# Patient Record
Sex: FEMALE | Race: BLACK OR AFRICAN AMERICAN | NOT HISPANIC OR LATINO | Employment: FULL TIME | URBAN - METROPOLITAN AREA
[De-identification: names, ages, dates, MRNs, and addresses within clinical notes are randomized per-mention and may not be internally consistent; named-entity substitution may affect disease eponyms.]

---

## 2019-03-27 ENCOUNTER — INITIAL CONSULT (OUTPATIENT)
Dept: NEUROSURGERY | Facility: CLINIC | Age: 49
End: 2019-03-27
Payer: COMMERCIAL

## 2019-03-27 VITALS
WEIGHT: 170.19 LBS | TEMPERATURE: 98 F | DIASTOLIC BLOOD PRESSURE: 80 MMHG | HEART RATE: 96 BPM | HEIGHT: 64 IN | BODY MASS INDEX: 29.06 KG/M2 | SYSTOLIC BLOOD PRESSURE: 116 MMHG

## 2019-03-27 DIAGNOSIS — H47.10 PAPILLEDEMA OF BOTH EYES: ICD-10-CM

## 2019-03-27 DIAGNOSIS — E22.1 HYPERPROLACTINEMIA: ICD-10-CM

## 2019-03-27 DIAGNOSIS — H53.469 HOMONYMOUS HEMIANOPIA, UNSPECIFIED LATERALITY: ICD-10-CM

## 2019-03-27 DIAGNOSIS — D49.7 NEOPLASM OF CENTRAL NERVOUS SYSTEM: ICD-10-CM

## 2019-03-27 DIAGNOSIS — D49.7 PITUITARY TUMOR: ICD-10-CM

## 2019-03-27 PROCEDURE — 99999 PR PBB SHADOW E&M-NEW PATIENT-LVL III: ICD-10-PCS | Mod: PBBFAC,,, | Performed by: NEUROLOGICAL SURGERY

## 2019-03-27 PROCEDURE — 99999 PR PBB SHADOW E&M-NEW PATIENT-LVL III: CPT | Mod: PBBFAC,,, | Performed by: NEUROLOGICAL SURGERY

## 2019-03-27 RX ORDER — OLMESARTAN MEDOXOMIL 5 MG/1
5 TABLET ORAL DAILY
COMMUNITY
End: 2019-04-12

## 2019-03-27 RX ORDER — CYCLOBENZAPRINE HCL 10 MG
10 TABLET ORAL 3 TIMES DAILY PRN
COMMUNITY
End: 2019-04-12

## 2019-03-27 NOTE — PROGRESS NOTES
Neurosurgery Outpatient Follow Up    Patient ID: Gianna Menendez is a 48 y.o. female.    Chief Complaint   Patient presents with    Brain Tumor     Patient referred for a pituitary lesion. She has vision loss in both eyes that started about a month ago. Right > left           Review of Systems   Constitutional: Negative for activity change, appetite change, chills, fever and unexpected weight change.   HENT: Positive for sinus pressure. Negative for tinnitus, trouble swallowing and voice change.    Eyes: Positive for visual disturbance.   Respiratory: Negative for apnea, cough, chest tightness and shortness of breath.    Cardiovascular: Negative for chest pain and palpitations.   Gastrointestinal: Negative for constipation, diarrhea, nausea and vomiting.   Genitourinary: Negative for difficulty urinating, dysuria, frequency and urgency.   Musculoskeletal: Negative for back pain, gait problem, neck pain and neck stiffness.   Skin: Negative for wound.   Neurological: Positive for headaches. Negative for dizziness, tremors, seizures, facial asymmetry, speech difficulty, weakness, light-headedness and numbness.        Visual loss, bilateral  Decreased peripheral vision   Psychiatric/Behavioral: Positive for dysphoric mood. Negative for confusion and decreased concentration.       History reviewed. No pertinent past medical history.  Social History     Socioeconomic History    Marital status:      Spouse name: Not on file    Number of children: Not on file    Years of education: Not on file    Highest education level: Not on file   Occupational History    Not on file   Social Needs    Financial resource strain: Not on file    Food insecurity:     Worry: Not on file     Inability: Not on file    Transportation needs:     Medical: Not on file     Non-medical: Not on file   Tobacco Use    Smoking status: Never Smoker   Substance and Sexual Activity    Alcohol use: Not on file    Drug use: Not on  "file    Sexual activity: Not on file   Lifestyle    Physical activity:     Days per week: Not on file     Minutes per session: Not on file    Stress: Not on file   Relationships    Social connections:     Talks on phone: Not on file     Gets together: Not on file     Attends Voodoo service: Not on file     Active member of club or organization: Not on file     Attends meetings of clubs or organizations: Not on file     Relationship status: Not on file    Intimate partner violence:     Fear of current or ex partner: Not on file     Emotionally abused: Not on file     Physically abused: Not on file     Forced sexual activity: Not on file   Other Topics Concern    Not on file   Social History Narrative    Not on file     Family History   Problem Relation Age of Onset    Rheum arthritis Maternal Grandmother      Review of patient's allergies indicates:   Allergen Reactions    Codeine     Latex, natural rubber Itching     Pt refused to go to latex free room       Current Outpatient Medications:     cyclobenzaprine (FLEXERIL) 10 MG tablet, Take 10 mg by mouth 3 (three) times daily as needed for Muscle spasms., Disp: , Rfl:     DIAZEPAM (VALIUM ORAL), Take by mouth continuous prn., Disp: , Rfl:     fluoxetine (PROZAC) 20 MG capsule, Take 20 mg by mouth once daily., Disp: , Rfl:     olmesartan (BENICAR) 5 MG Tab, Take 5 mg by mouth once daily., Disp: , Rfl:   Blood pressure 116/80, pulse 96, temperature 98.3 °F (36.8 °C), temperature source Oral, height 5' 4" (1.626 m), weight 77.2 kg (170 lb 3.1 oz).      Neurologic Exam     Mental Status   Oriented to person, place, and time.   Speech: speech is normal     Cranial Nerves     CN III, IV, VI   Pupils are equal, round, and reactive to light.  Extraocular motions are normal.     Motor Exam     Strength   Strength 5/5 throughout.     Gait, Coordination, and Reflexes     Reflexes   Right brachioradialis: 2+  Left brachioradialis: 2+  Right biceps: 2+  Left " biceps: 2+  Right triceps: 2+  Left triceps: 2+  Right patellar: 2+  Left patellar: 2+  Right achilles: 2+  Left achilles: 2+      Physical Exam   Constitutional: She is oriented to person, place, and time. She appears well-developed and well-nourished.   HENT:   Head: Normocephalic and atraumatic.   Eyes: Pupils are equal, round, and reactive to light. EOM are normal.   Neck: Normal range of motion. Neck supple.   Cardiovascular: Normal rate and intact distal pulses.   Pulmonary/Chest: Effort normal. No respiratory distress.   Abdominal: Soft. She exhibits no distension.   Musculoskeletal: Normal range of motion. She exhibits no edema or deformity.   Neurological: She is oriented to person, place, and time. She has normal strength and normal reflexes. She displays no tremor. A cranial nerve deficit is present. No sensory deficit. She exhibits normal muscle tone. She displays no seizure activity. Coordination and gait normal. GCS eye subscore is 4. GCS verbal subscore is 5. GCS motor subscore is 6. She displays no Babinski's sign on the right side. She displays no Babinski's sign on the left side.   Reflex Scores:       Tricep reflexes are 2+ on the right side and 2+ on the left side.       Bicep reflexes are 2+ on the right side and 2+ on the left side.       Brachioradialis reflexes are 2+ on the right side and 2+ on the left side.       Patellar reflexes are 2+ on the right side and 2+ on the left side.       Achilles reflexes are 2+ on the right side and 2+ on the left side.  Bilateral homonymous hemianopsia  Papilloedema on fundoscopy bilaterally    Enlarged breast/areolar tissue  No galactorrhea   Skin: Skin is warm and dry.   Psychiatric: She has a normal mood and affect. Her speech is normal and behavior is normal. Judgment and thought content normal.   Nursing note and vitals reviewed.      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 Effie Light for evaluation of a newly discovered pituitary lesion. She was seen by ophthalmology for visual loss and a brain MRI demonstrated a large sellar lesion for which she was referred to Dr Modi. This is associated decreased FSH and free T4 and severe elevation of Prolactin with >6,0000.     She is  and has no clear history of neuroendocrinological disturbance in the past, and has been on a depot anticontraceptive injection for 15 years. She now feels fairly free of symptoms aside from the visual loss. She does not have signifcant headaches, nausea, vomiting, and no new weight gain.     I have reviewed a the recent MRI scans showing a giant hypodense lesion in the sella invading the sphenoid sinus and the bilateral cavernous sinus walls, eroding the clivus and displacing the pituitary gland and the optic apparatus cephalad. It is measuring over 3.3 x 4.5 cm and has areas of mixed density but avid contrast reuptake.    This lady has severe visual loss and neuroendocrine dysfunction and hyperprolactinemia associated with a giant pituitary macroadenoma, and she would benefit from urgent surgical treatment. Although medical treatment is the mainstay of prolactinoma treatment, this giant lesion with erosion of the clivus and displacement of the optic chiasm require urgent surgical removal to recover vision. Given its atypical appearance histopathology is also required for definitive diagnosis.     We will plan an urgent endoscopic transnasal transsphenoidal approach in collaboration with Dr. Guillaume (ENT). We will schedule this as soon as possible.    Visit Diagnosis:  Pituitary tumor    Papilledema of both eyes    Homonymous hemianopia, unspecified laterality    Hyperprolactinemia

## 2019-03-28 DIAGNOSIS — D49.7 NEOPLASM OF CENTRAL NERVOUS SYSTEM: ICD-10-CM

## 2019-03-28 DIAGNOSIS — D49.7 PITUITARY TUMOR: Primary | ICD-10-CM

## 2019-03-28 RX ORDER — LIDOCAINE HYDROCHLORIDE 10 MG/ML
1 INJECTION, SOLUTION EPIDURAL; INFILTRATION; INTRACAUDAL; PERINEURAL ONCE
Status: CANCELLED | OUTPATIENT
Start: 2019-03-28 | End: 2019-03-28

## 2019-03-28 RX ORDER — SODIUM CHLORIDE, SODIUM LACTATE, POTASSIUM CHLORIDE, CALCIUM CHLORIDE 600; 310; 30; 20 MG/100ML; MG/100ML; MG/100ML; MG/100ML
INJECTION, SOLUTION INTRAVENOUS CONTINUOUS
Status: CANCELLED | OUTPATIENT
Start: 2019-03-28

## 2019-04-03 ENCOUNTER — TELEPHONE (OUTPATIENT)
Dept: NEUROSURGERY | Facility: CLINIC | Age: 49
End: 2019-04-03

## 2019-04-03 DIAGNOSIS — D49.7 NEOPLASM OF CENTRAL NERVOUS SYSTEM: Primary | ICD-10-CM

## 2019-04-03 NOTE — TELEPHONE ENCOUNTER
----- Message from Lou Harman sent at 4/3/2019 10:11 AM CDT -----  Contact: Patient  Type:  Patient Returning Call    Who Called:  Patient  Who Left Message for Patient:  Yadira  Does the patient know what this is regarding?:  procedure  Best Call Back Number:   Additional Information:  Call to pod. Call connected to pod. Yadira is on the phone, will call patient back.

## 2019-04-04 ENCOUNTER — HOSPITAL ENCOUNTER (OUTPATIENT)
Dept: RADIOLOGY | Facility: HOSPITAL | Age: 49
Discharge: HOME | End: 2019-04-04
Attending: NEUROLOGICAL SURGERY
Payer: COMMERCIAL

## 2019-04-04 DIAGNOSIS — D49.7 PITUITARY TUMOR: Primary | ICD-10-CM

## 2019-04-04 DIAGNOSIS — D49.7 NEOPLASM OF CENTRAL NERVOUS SYSTEM: ICD-10-CM

## 2019-04-04 PROCEDURE — 70553 MRI BRAIN STEM W/O & W/DYE: CPT | Mod: TC,PO

## 2019-04-04 PROCEDURE — A9585 GADOBUTROL INJECTION: HCPCS | Mod: PO | Performed by: NEUROLOGICAL SURGERY

## 2019-04-04 PROCEDURE — 70553 MRI BRAIN STEM W/O & W/DYE: CPT | Mod: 26,,, | Performed by: RADIOLOGY

## 2019-04-04 PROCEDURE — 70486 CT MEDTRONIC SINUSES WITHOUT: ICD-10-PCS | Mod: 26,,, | Performed by: RADIOLOGY

## 2019-04-04 PROCEDURE — 70486 CT MAXILLOFACIAL W/O DYE: CPT | Mod: TC,PO

## 2019-04-04 PROCEDURE — 25500020 PHARM REV CODE 255: Mod: PO | Performed by: NEUROLOGICAL SURGERY

## 2019-04-04 PROCEDURE — 70553 MRI BRAIN SYNAPTIVE STEALTH W/WO CONTRAST: ICD-10-PCS | Mod: 26,,, | Performed by: RADIOLOGY

## 2019-04-04 PROCEDURE — 70486 CT MAXILLOFACIAL W/O DYE: CPT | Mod: 26,,, | Performed by: RADIOLOGY

## 2019-04-04 RX ORDER — GADOBUTROL 604.72 MG/ML
7 INJECTION INTRAVENOUS
Status: COMPLETED | OUTPATIENT
Start: 2019-04-04 | End: 2019-04-04

## 2019-04-04 RX ORDER — SODIUM CHLORIDE, SODIUM LACTATE, POTASSIUM CHLORIDE, CALCIUM CHLORIDE 600; 310; 30; 20 MG/100ML; MG/100ML; MG/100ML; MG/100ML
INJECTION, SOLUTION INTRAVENOUS CONTINUOUS
Status: CANCELLED | OUTPATIENT
Start: 2019-04-04

## 2019-04-04 RX ADMIN — GADOBUTROL 7 ML: 604.72 INJECTION INTRAVENOUS at 12:04

## 2019-04-08 PROBLEM — I10 ESSENTIAL (PRIMARY) HYPERTENSION: Status: ACTIVE | Noted: 2019-04-08

## 2019-04-08 PROBLEM — F17.200 TOBACCO USE DISORDER: Status: ACTIVE | Noted: 2019-04-08

## 2019-04-08 PROBLEM — D49.7 PITUITARY TUMOR: Status: ACTIVE | Noted: 2019-04-08

## 2019-04-09 PROBLEM — E83.42 HYPOMAGNESEMIA: Status: ACTIVE | Noted: 2019-04-09

## 2019-04-10 PROBLEM — E03.9 ACQUIRED HYPOTHYROIDISM: Status: ACTIVE | Noted: 2019-04-10

## 2019-04-16 ENCOUNTER — TELEPHONE (OUTPATIENT)
Dept: NEUROSURGERY | Facility: CLINIC | Age: 49
End: 2019-04-16

## 2019-04-16 NOTE — TELEPHONE ENCOUNTER
KATHY. Patient had her one week post op BMP done today in [place]. Results have been scanned into media.

## 2019-05-30 DIAGNOSIS — D49.7 NEOPLASM OF CENTRAL NERVOUS SYSTEM: ICD-10-CM

## 2019-06-04 DIAGNOSIS — D49.7 NEOPLASM OF CENTRAL NERVOUS SYSTEM: Primary | ICD-10-CM

## 2019-06-05 ENCOUNTER — OFFICE VISIT (OUTPATIENT)
Dept: NEUROSURGERY | Facility: CLINIC | Age: 49
End: 2019-06-05
Payer: COMMERCIAL

## 2019-06-05 ENCOUNTER — HOSPITAL ENCOUNTER (OUTPATIENT)
Dept: RADIOLOGY | Facility: HOSPITAL | Age: 49
Discharge: HOME | End: 2019-06-05
Attending: NEUROLOGICAL SURGERY
Payer: COMMERCIAL

## 2019-06-05 VITALS
DIASTOLIC BLOOD PRESSURE: 80 MMHG | TEMPERATURE: 98 F | SYSTOLIC BLOOD PRESSURE: 130 MMHG | WEIGHT: 172.63 LBS | HEART RATE: 86 BPM | BODY MASS INDEX: 29.47 KG/M2 | HEIGHT: 64 IN

## 2019-06-05 DIAGNOSIS — D49.7 NEOPLASM OF CENTRAL NERVOUS SYSTEM: ICD-10-CM

## 2019-06-05 DIAGNOSIS — D44.3 PROLACTINOMA OF UNCERTAIN BEHAVIOR: Primary | ICD-10-CM

## 2019-06-05 PROCEDURE — 99024 PR POST-OP FOLLOW-UP VISIT: ICD-10-PCS | Mod: S$GLB,,, | Performed by: NEUROLOGICAL SURGERY

## 2019-06-05 PROCEDURE — 70553 MRI BRAIN STEM W/O & W/DYE: CPT | Mod: 26,,, | Performed by: RADIOLOGY

## 2019-06-05 PROCEDURE — 70553 MRI PITUITARY W W/O CONTRAST: ICD-10-PCS | Mod: 26,,, | Performed by: RADIOLOGY

## 2019-06-05 PROCEDURE — 70553 MRI BRAIN STEM W/O & W/DYE: CPT | Mod: TC,PO

## 2019-06-05 PROCEDURE — 25500020 PHARM REV CODE 255: Mod: PO | Performed by: NEUROLOGICAL SURGERY

## 2019-06-05 PROCEDURE — 99999 PR PBB SHADOW E&M-EST. PATIENT-LVL III: CPT | Mod: PBBFAC,,, | Performed by: NEUROLOGICAL SURGERY

## 2019-06-05 PROCEDURE — 99024 POSTOP FOLLOW-UP VISIT: CPT | Mod: S$GLB,,, | Performed by: NEUROLOGICAL SURGERY

## 2019-06-05 PROCEDURE — 99999 PR PBB SHADOW E&M-EST. PATIENT-LVL III: ICD-10-PCS | Mod: PBBFAC,,, | Performed by: NEUROLOGICAL SURGERY

## 2019-06-05 PROCEDURE — A9585 GADOBUTROL INJECTION: HCPCS | Mod: PO | Performed by: NEUROLOGICAL SURGERY

## 2019-06-05 RX ORDER — GADOBUTROL 604.72 MG/ML
7 INJECTION INTRAVENOUS
Status: COMPLETED | OUTPATIENT
Start: 2019-06-05 | End: 2019-06-05

## 2019-06-05 RX ADMIN — GADOBUTROL 7 ML: 604.72 INJECTION INTRAVENOUS at 01:06

## 2019-06-20 ENCOUNTER — TELEPHONE (OUTPATIENT)
Dept: OPHTHALMOLOGY | Facility: CLINIC | Age: 49
End: 2019-06-20

## 2019-06-20 NOTE — TELEPHONE ENCOUNTER
----- Message from Omaira Huerta sent at 6/20/2019  9:26 AM CDT -----  Cheryle, please call this pt.  Pt is on for tomorrow to see Manish but in the notes it states she is to see Neuro-Ophthalmology.  I not sure if Wei can do anything for pt.                                                                                   Thanks

## 2019-06-20 NOTE — TELEPHONE ENCOUNTER
Called pt concerning appt with Dr Hammond for 6./21. Let pt know that she needed to see a neuroophthalmologic instead of general per Dr Gar. Pt said the nurse at Dr Gar's office said she could see someone close to home and she did see a doctor in [place], [place] last week. She also had a visual field test at that time. I told her see needs to have the records sent form that ophthalmologist to Dr Gar's office. Pt will do that.

## 2019-08-22 ENCOUNTER — OUTSIDE PLACE OF SERVICE (OUTPATIENT)
Dept: NEUROSURGERY | Facility: CLINIC | Age: 49
End: 2019-08-22
Payer: COMMERCIAL

## 2019-08-22 ENCOUNTER — TELEPHONE (OUTPATIENT)
Dept: NEUROSURGERY | Facility: CLINIC | Age: 49
End: 2019-08-22

## 2019-08-22 DIAGNOSIS — D49.7 PITUITARY TUMOR: ICD-10-CM

## 2019-08-22 DIAGNOSIS — H47.10 PAPILLEDEMA OF BOTH EYES: ICD-10-CM

## 2019-08-22 DIAGNOSIS — D44.3 PROLACTINOMA OF UNCERTAIN BEHAVIOR: Primary | ICD-10-CM

## 2019-08-22 DIAGNOSIS — E22.1 HYPERPROLACTINEMIA: ICD-10-CM

## 2019-08-22 DIAGNOSIS — H53.469 HOMONYMOUS HEMIANOPIA, UNSPECIFIED LATERALITY: ICD-10-CM

## 2019-08-22 NOTE — TELEPHONE ENCOUNTER
----- Message from Penelope Amado sent at 8/22/2019  9:45 AM CDT -----  Contact: pt @ [phone]  Caller is asking to have the work excuse note faxed to her employer @ [phone]

## 2019-09-24 ENCOUNTER — LAB VISIT (OUTPATIENT)
Dept: LAB | Facility: HOSPITAL | Age: 49
End: 2019-09-24
Attending: NEUROLOGICAL SURGERY
Payer: COMMERCIAL

## 2019-09-24 DIAGNOSIS — D49.7 PITUITARY TUMOR: ICD-10-CM

## 2019-09-24 DIAGNOSIS — D44.3 PROLACTINOMA OF UNCERTAIN BEHAVIOR: ICD-10-CM

## 2019-09-24 DIAGNOSIS — H53.469 HOMONYMOUS HEMIANOPIA, UNSPECIFIED LATERALITY: ICD-10-CM

## 2019-09-24 DIAGNOSIS — E22.1 HYPERPROLACTINEMIA: ICD-10-CM

## 2019-09-24 DIAGNOSIS — H47.10 PAPILLEDEMA OF BOTH EYES: ICD-10-CM

## 2019-09-24 LAB
ALBUMIN SERPL BCP-MCNC: 4.1 G/DL (ref 3.5–5.2)
ALP SERPL-CCNC: 144 U/L (ref 55–135)
ALT SERPL W/O P-5'-P-CCNC: 27 U/L (ref 10–44)
ANION GAP SERPL CALC-SCNC: 10 MMOL/L (ref 8–16)
AST SERPL-CCNC: 29 U/L (ref 10–40)
BILIRUB SERPL-MCNC: 1 MG/DL (ref 0.1–1)
BUN SERPL-MCNC: 9 MG/DL (ref 6–20)
CALCIUM SERPL-MCNC: 10 MG/DL (ref 8.7–10.5)
CHLORIDE SERPL-SCNC: 106 MMOL/L (ref 95–110)
CO2 SERPL-SCNC: 23 MMOL/L (ref 23–29)
CORTIS SERPL-MCNC: 10.4 UG/DL (ref 4.3–22.4)
CREAT SERPL-MCNC: 0.7 MG/DL (ref 0.5–1.4)
EST. GFR  (AFRICAN AMERICAN): >60 ML/MIN/1.73 M^2
EST. GFR  (NON AFRICAN AMERICAN): >60 ML/MIN/1.73 M^2
FSH SERPL-ACNC: 4.7 MIU/ML
GLUCOSE SERPL-MCNC: 90 MG/DL (ref 70–110)
LH SERPL-ACNC: 0.3 MIU/ML
POTASSIUM SERPL-SCNC: 3.6 MMOL/L (ref 3.5–5.1)
PROLACTIN SERPL IA-MCNC: 61.6 NG/ML (ref 5.2–26.5)
PROT SERPL-MCNC: 8.1 G/DL (ref 6–8.4)
SODIUM SERPL-SCNC: 139 MMOL/L (ref 136–145)
T4 FREE SERPL-MCNC: 1 NG/DL (ref 0.71–1.51)

## 2019-09-24 PROCEDURE — 82533 TOTAL CORTISOL: CPT

## 2019-09-24 PROCEDURE — 83001 ASSAY OF GONADOTROPIN (FSH): CPT

## 2019-09-24 PROCEDURE — 82024 ASSAY OF ACTH: CPT

## 2019-09-24 PROCEDURE — 84146 ASSAY OF PROLACTIN: CPT

## 2019-09-24 PROCEDURE — 84305 ASSAY OF SOMATOMEDIN: CPT

## 2019-09-24 PROCEDURE — 84439 ASSAY OF FREE THYROXINE: CPT

## 2019-09-24 PROCEDURE — 83002 ASSAY OF GONADOTROPIN (LH): CPT

## 2019-09-24 PROCEDURE — 80053 COMPREHEN METABOLIC PANEL: CPT

## 2019-09-24 PROCEDURE — 36415 COLL VENOUS BLD VENIPUNCTURE: CPT

## 2019-09-27 LAB — ACTH PLAS-MCNC: 19 PG/ML (ref 0–46)

## 2019-10-01 LAB
IGF-I SERPL-MCNC: 58 NG/ML (ref 44–227)
IGF-I Z-SCORE SERPL: -1.65 SD

## 2019-10-08 ENCOUNTER — OFFICE VISIT (OUTPATIENT)
Dept: OPHTHALMOLOGY | Facility: CLINIC | Age: 49
End: 2019-10-08
Payer: COMMERCIAL

## 2019-10-08 ENCOUNTER — OFFICE VISIT (OUTPATIENT)
Dept: NEUROSURGERY | Facility: CLINIC | Age: 49
End: 2019-10-08
Payer: COMMERCIAL

## 2019-10-08 ENCOUNTER — OFFICE VISIT (OUTPATIENT)
Dept: ENDOCRINOLOGY | Facility: CLINIC | Age: 49
End: 2019-10-08
Payer: COMMERCIAL

## 2019-10-08 ENCOUNTER — CLINICAL SUPPORT (OUTPATIENT)
Dept: OPHTHALMOLOGY | Facility: CLINIC | Age: 49
End: 2019-10-08
Payer: COMMERCIAL

## 2019-10-08 ENCOUNTER — TELEPHONE (OUTPATIENT)
Dept: NEUROSURGERY | Facility: CLINIC | Age: 49
End: 2019-10-08

## 2019-10-08 VITALS
SYSTOLIC BLOOD PRESSURE: 152 MMHG | DIASTOLIC BLOOD PRESSURE: 94 MMHG | WEIGHT: 174.63 LBS | WEIGHT: 174.63 LBS | BODY MASS INDEX: 29.97 KG/M2 | SYSTOLIC BLOOD PRESSURE: 154 MMHG | TEMPERATURE: 98 F | TEMPERATURE: 98 F | HEART RATE: 88 BPM | BODY MASS INDEX: 29.97 KG/M2 | DIASTOLIC BLOOD PRESSURE: 94 MMHG | HEART RATE: 88 BPM

## 2019-10-08 DIAGNOSIS — D35.2 BENIGN PROLACTINOMA: Primary | ICD-10-CM

## 2019-10-08 DIAGNOSIS — E22.1 HYPERPROLACTINEMIA: Primary | ICD-10-CM

## 2019-10-08 DIAGNOSIS — H53.461: ICD-10-CM

## 2019-10-08 DIAGNOSIS — D44.3 PROLACTINOMA OF UNCERTAIN BEHAVIOR: Primary | ICD-10-CM

## 2019-10-08 DIAGNOSIS — H47.20 COMPRESSIVE OPTIC ATROPHY: ICD-10-CM

## 2019-10-08 DIAGNOSIS — D49.7 PITUITARY TUMOR: ICD-10-CM

## 2019-10-08 DIAGNOSIS — E22.1 HYPERPROLACTINEMIA: ICD-10-CM

## 2019-10-08 DIAGNOSIS — D44.3 PROLACTINOMA OF UNCERTAIN BEHAVIOR: ICD-10-CM

## 2019-10-08 DIAGNOSIS — E03.9 ACQUIRED HYPOTHYROIDISM: ICD-10-CM

## 2019-10-08 DIAGNOSIS — D35.2 PITUITARY ADENOMA: ICD-10-CM

## 2019-10-08 DIAGNOSIS — D49.7 PITUITARY TUMOR: Primary | ICD-10-CM

## 2019-10-08 PROBLEM — E23.2: Status: ACTIVE | Noted: 2019-10-08

## 2019-10-08 PROCEDURE — 3008F PR BODY MASS INDEX (BMI) DOCUMENTED: ICD-10-PCS | Mod: CPTII,S$GLB,, | Performed by: NEUROLOGICAL SURGERY

## 2019-10-08 PROCEDURE — 92083 EXTENDED VISUAL FIELD XM: CPT | Mod: S$GLB,,, | Performed by: OPHTHALMOLOGY

## 2019-10-08 PROCEDURE — 92004 PR EYE EXAM, NEW PATIENT,COMPREHESV: ICD-10-PCS | Mod: S$GLB,,, | Performed by: OPHTHALMOLOGY

## 2019-10-08 PROCEDURE — 99999 PR PBB SHADOW E&M-EST. PATIENT-LVL III: CPT | Mod: PBBFAC,,, | Performed by: INTERNAL MEDICINE

## 2019-10-08 PROCEDURE — 3008F PR BODY MASS INDEX (BMI) DOCUMENTED: ICD-10-PCS | Mod: CPTII,S$GLB,, | Performed by: INTERNAL MEDICINE

## 2019-10-08 PROCEDURE — 92133 CPTRZD OPH DX IMG PST SGM ON: CPT | Mod: S$GLB,,, | Performed by: OPHTHALMOLOGY

## 2019-10-08 PROCEDURE — 92083 HUMPHREY VISUAL FIELD - OU - BOTH EYES: ICD-10-PCS | Mod: S$GLB,,, | Performed by: OPHTHALMOLOGY

## 2019-10-08 PROCEDURE — 99213 OFFICE O/P EST LOW 20 MIN: CPT | Mod: S$GLB,,, | Performed by: NEUROLOGICAL SURGERY

## 2019-10-08 PROCEDURE — 3008F BODY MASS INDEX DOCD: CPT | Mod: CPTII,S$GLB,, | Performed by: NEUROLOGICAL SURGERY

## 2019-10-08 PROCEDURE — 99999 PR PBB SHADOW E&M-EST. PATIENT-LVL III: ICD-10-PCS | Mod: PBBFAC,,, | Performed by: NEUROLOGICAL SURGERY

## 2019-10-08 PROCEDURE — 99204 PR OFFICE/OUTPT VISIT, NEW, LEVL IV, 45-59 MIN: ICD-10-PCS | Mod: S$GLB,,, | Performed by: INTERNAL MEDICINE

## 2019-10-08 PROCEDURE — 99999 PR PBB SHADOW E&M-EST. PATIENT-LVL III: CPT | Mod: PBBFAC,,, | Performed by: NEUROLOGICAL SURGERY

## 2019-10-08 PROCEDURE — 3008F BODY MASS INDEX DOCD: CPT | Mod: CPTII,S$GLB,, | Performed by: INTERNAL MEDICINE

## 2019-10-08 PROCEDURE — 99999 PR PBB SHADOW E&M-EST. PATIENT-LVL III: ICD-10-PCS | Mod: PBBFAC,,, | Performed by: INTERNAL MEDICINE

## 2019-10-08 PROCEDURE — 92133 POSTERIOR SEGMENT OCT OPTIC NERVE(OCULAR COHERENCE TOMOGRAPHY) - OU - BOTH EYES: ICD-10-PCS | Mod: S$GLB,,, | Performed by: OPHTHALMOLOGY

## 2019-10-08 PROCEDURE — 99204 OFFICE O/P NEW MOD 45 MIN: CPT | Mod: S$GLB,,, | Performed by: INTERNAL MEDICINE

## 2019-10-08 PROCEDURE — 99999 PR PBB SHADOW E&M-EST. PATIENT-LVL II: CPT | Mod: PBBFAC,,, | Performed by: OPHTHALMOLOGY

## 2019-10-08 PROCEDURE — 92004 COMPRE OPH EXAM NEW PT 1/>: CPT | Mod: S$GLB,,, | Performed by: OPHTHALMOLOGY

## 2019-10-08 PROCEDURE — 99213 PR OFFICE/OUTPT VISIT, EST, LEVL III, 20-29 MIN: ICD-10-PCS | Mod: S$GLB,,, | Performed by: NEUROLOGICAL SURGERY

## 2019-10-08 PROCEDURE — 99999 PR PBB SHADOW E&M-EST. PATIENT-LVL II: ICD-10-PCS | Mod: PBBFAC,,, | Performed by: OPHTHALMOLOGY

## 2019-10-08 RX ORDER — LEVOTHYROXINE SODIUM 75 UG/1
75 TABLET ORAL
Qty: 30 TABLET | Refills: 11 | Status: SHIPPED | OUTPATIENT
Start: 2019-10-08 | End: 2020-10-09

## 2019-10-08 RX ORDER — DICLOFENAC SODIUM 75 MG/1
TABLET, DELAYED RELEASE ORAL
COMMUNITY
Start: 2019-08-16

## 2019-10-08 RX ORDER — CABERGOLINE 0.5 MG/1
0.25 TABLET ORAL
Qty: 12 TABLET | Refills: 1 | Status: SHIPPED | OUTPATIENT
Start: 2019-10-10 | End: 2020-06-04

## 2019-10-08 RX ORDER — CABERGOLINE 0.5 MG/1
0.25 TABLET ORAL
COMMUNITY
Start: 2019-09-24 | End: 2019-10-08 | Stop reason: SDUPTHER

## 2019-10-08 NOTE — ASSESSMENT & PLAN NOTE
Clinically euthyroid on 75 mcg levothyroxine daily with normal fT4 last month.  Will continue this dose and repeat fT4 in 6 months.

## 2019-10-08 NOTE — ASSESSMENT & PLAN NOTE
Had excellent response to low dose cabergoline (postop PRL ~1000 --> 66) thus will continue current dose of 0.25 mg twice weekly.  Will need to follow closely given that she had an aggressive tumor.  Repeat PRL level with MRI in 6 months.

## 2019-10-08 NOTE — LETTER
[facility] - Ophthalmology  [address]  [facility] [address]  Phone: [phone]  Fax: [phone]   October 8, 2019    Yomi Gar MD  [address]  [address]  [facility] [address]    Patient: Gianna Menendez   MR Number: [id number]   YOB: 1970   Date of Visit: 10/8/2019       Dear Dr. Gar:    Thank you for referring Gianna Menendez to me for evaluation. Here is my assessment and plan of care:    Assessment:  /Plan     For exam results, see Encounter Report.    Benign prolactinoma  -     [place] Visual Field - OU - Extended - Both Eyes  -     Posterior Segment OCT Optic Nerve- Both eyes    Compressive optic atrophy    Quadrant hemianopia, right      Ms. Menendez has permanent nerve fiber loss in both optic nerves but has sufficient reserves that she should be able to function normally. I will repeat her exam and visual field testing in three months to assess the status of her optic nerves.          Plan:  For exam results, see Encounter Report.    Benign prolactinoma  -     [place] Visual Field - OU - Extended - Both Eyes  -     Posterior Segment OCT Optic Nerve- Both eyes    Compressive optic atrophy    Quadrant hemianopia, right      Ms. Menendez has permanent nerve fiber loss in both optic nerves but has sufficient reserves that she should be able to function normally. I will repeat her exam and visual field testing in three months to assess the status of her optic nerves.            Below you will find my full exam findings. If you have questions, please do not hesitate to call me. I look forward to following Ms. Gianna Menendez along with you.    Sincerely,          Anjel Chowdhury MD       CC  No Recipients             Base Eye Exam     Visual Acuity (Snellen - Linear)       Right Left    Dist sc 20/50 -2+1 20/20    Dist ph sc 20/30 -1+1           Tonometry (Applanation, 10:04 AM)       Right Left    Pressure 12 12          Pupils       Dark Light Shape React APD     Right 5 3 Round Brisk +1    Left 5 3 Round Brisk None          Visual Fields    See HVF report           Extraocular Movement       Right Left     Full, Ortho Full, Ortho          Neuro/Psych     Oriented x3:  Yes    Mood/Affect:  Normal          Dilation     Both eyes:  1% Mydriacyl, 2.5% Phenylephrine @ 10:07 AM            Slit Lamp and Fundus Exam     External Exam       Right Left    External Normal Normal          Slit Lamp Exam       Right Left    Lids/Lashes Normal Normal    Conjunctiva/Sclera White and quiet White and quiet    Cornea Clear Clear    Anterior Chamber Deep and quiet Deep and quiet    Iris Round and reactive Round and reactive    Lens Clear Clear    Vitreous Normal Normal          Fundus Exam       Right Left    Disc 2+ Pallor 1+ Pallor    C/D Ratio 0.3 0.3    Macula Normal Normal    Vessels Normal Normal    Periphery Normal Normal

## 2019-10-08 NOTE — PROGRESS NOTES
HPI     Concerns About Ocular Health      Additional comments: pituitary prolactinoma              Comments     Referred by Dr.Sebastian KJ Gar  Recently Pituitary tumor removal(partial) in 04/2019.  Dx w/Papilledema.  Patient states OU vision seem stable.  No eye pain.    I have personally interviewed the patient, reviewed the history and   examined the patient and agree with the technician's exam.          Last edited by Anjel Chowdhury MD on 10/8/2019  9:49 AM. (History)            Assessment /Plan     For exam results, see Encounter Report.    Benign prolactinoma  -     [place] Visual Field - OU - Extended - Both Eyes  -     Posterior Segment OCT Optic Nerve- Both eyes    Compressive optic atrophy    Quadrant hemianopia, right      Ms. Menendez has permanent nerve fiber loss in both optic nerves but has sufficient reserves that she should be able to function normally. I will repeat her exam and visual field testing in three months to assess the status of her optic nerves.

## 2019-10-08 NOTE — ASSESSMENT & PLAN NOTE
Vision greatly improved and having excellent response to cabergoline thus no change to management now.  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  She has no symptoms of adrenal insufficiency.  We discussed symptoms and she will contact us if any develop.  Repeat MRI in 6 months given how aggressive tumor was.

## 2019-10-08 NOTE — PROGRESS NOTES
Subjective:   I, Jinny Ferris, attest that this documentation has been prepared under the direction and in the presence of Tan Pino MD.     Patient ID: Gianna Menendez is a 48 y.o. female     Chief Complaint: No chief complaint on file.      HPI  Ms. Gianna Menendez is a pleasant 48 y.o. woman who is s/p transphenoidal hypophysectomy of pituitary tumor with Dr. Gar on 04/08/2019 and presents today for 1 month follow up in our multidisciplinary pituitary clinic. Pt states she was found to have a large prolactinoma from she had associated visual field loss earlier in the year. She states she never tried medical management prior to the resection as the tumor was rapidly growing and threatening her vision. She states her Prolactin level prior to and after surgery was approximately 6000 and 1000, respectively. She started 0.25 mg of cabergoline twice weekly after surgery and has tolerated it well without side effects. Her last prolactin in 09/2019 was 61. Pt states she has been doing well postoperatively and her only complaint at this time is mild back pain. Pt was seen by Dr. Chowdhury today; review of his note indicates pt has permanent nerve fiber loss in both optic nerves but has sufficient reserves that she should be able to function normally.    Review of Systems   Constitutional: Negative for activity change, fatigue, fever and unexpected weight change.   HENT: Negative for facial swelling.    Eyes: Negative.    Respiratory: Negative.    Cardiovascular: Negative.    Gastrointestinal: Negative for diarrhea, nausea and vomiting.   Genitourinary: Negative.    Musculoskeletal: Positive for back pain. Negative for joint swelling, myalgias and neck pain.   Neurological: Negative for dizziness, weakness, numbness and headaches.   Psychiatric/Behavioral: Negative.       Past Medical History:   Diagnosis Date    Hypertension     Sciatic nerve pain        Objective:      Vitals:    10/08/19 1139   BP: (!) 154/94    Pulse: 88   Temp: 98 °F (36.7 °C)      Physical Exam   Constitutional: She is oriented to person, place, and time. She appears well-developed and well-nourished.   HENT:   Head: Normocephalic and atraumatic.   Neck: Neck supple.   Neurological: She is alert and oriented to person, place, and time. No cranial nerve deficit. She displays a negative Romberg sign. GCS eye subscore is 4. GCS verbal subscore is 5. GCS motor subscore is 6.           I, Dr. Tan Pino, personally performed the services described in this documentation. All medical record entries made by the scribe, Jinny Ferris, were at my direction and in my presence.  I have reviewed the chart and agree that the record reflects my personal performance and is accurate and complete. Tan Pino MD. 10/08/2019    Assessment:       Prolactinoma.     Plan:     I will schedule the patient for 6 month follow up with MRI Brain and pituitary labs.

## 2019-10-08 NOTE — PROGRESS NOTES
"Gianna Menendez is a 48 y.o. female with prolactinoma s/p TSR now with central hypothyroidism referred by Dr. Gar for evaluation of persistent hyperprolactinemia    History of Present Illness    Hyperprolactinemia  She underwent TSR on 4/8/19 by Dr. 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   Prior to surgery she was having vision problems which initially prompted evaluation.  She denies having galactorrhea or menstrual abnormalities but she is on depo.  Patient reports that because adenoma was growing rapidly and causing vision loss she was taken to surgery and has never tried medical management.  She started 0.25 mg of cabergoline twice weekly after surgery and has tolerated it well without side effects.  Her last PRL was 61 in 9/2019.  She currently denies HA and vision has significantly improved.  She saw Dr. Chowdhury (neurophthalmology) today and has "permanent nerve fiber loss in both optic nerves but has sufficient reserves that she should be able to function normally."    Today she is feeling well and has no complaints.  Recent labs show normal IGF-1, am cortisol, and ACTH.  She has no symptoms of adrenal insufficiency or DI.    Central Hypothyroidism  She has central hypothyroidism (unclear if present prior to surgery but per patient was not told of any thyroid dysfunction prior) and was started on levothyroxine 25 mcg daily after surgery and has been gradually titrated up.  She reports compliance with 75 mcg daily with last free T4 being 1.0 on 9/24/19.   Current symptoms: none . Patient denies change in energy level, heat / cold intolerance and palpitations. Symptoms have been basically asymptomatic.   She has lost about 10 lbs over the past 6 months without trying.    Past Medical History:   Diagnosis Date    " Hypertension     Sciatic nerve pain      Past Surgical History:   Procedure Laterality Date    EXCISION, NEOPLASM, PITUITARY, OPEN, TRANSSPHENOIDAL APPROACH, USING COMPUTER-ASSISTED NAVIGATION N/A 4/8/2019    Procedure: EXCISION, NEOPLASM, PITUITARY, OPEN, TRANSSPHENOIDAL APPROACH, USING COMPUTER-ASSISTED NAVIGATION;  Surgeon: Yomi Gar MD;  Location: [facility] OR;  Service: Neurosurgery;  Laterality: N/A;    FUNCTIONAL ENDOSCOPIC SINUS SURGERY (FESS) Bilateral 4/8/2019    Procedure: FESS (FUNCTIONAL ENDOSCOPIC SINUS SURGERY) Sphenoidectomy and Partial Ethmoidectomy;  Surgeon: Tino Syed MD;  Location: [facility];  Service: ENT;  Laterality: Bilateral;    INNER EAR SURGERY       Family History   Problem Relation Age of Onset    Hypertension Mother     Heart disease Mother     Heart disease Maternal Grandmother     Hypertension Maternal Grandmother      Social History     Socioeconomic History    Marital status: Single     Spouse name: Not on file    Number of children: Not on file    Years of education: Not on file    Highest education level: Not on file   Occupational History    Not on file   Social Needs    Financial resource strain: Not on file    Food insecurity:     Worry: Not on file     Inability: Not on file    Transportation needs:     Medical: Not on file     Non-medical: Not on file   Tobacco Use    Smoking status: Heavy Tobacco Smoker     Packs/day: 0.50    Smokeless tobacco: Never Used   Substance and Sexual Activity    Alcohol use: Yes     Alcohol/week: 8.0 standard drinks     Types: 8 Cans of beer per week    Drug use: Never    Sexual activity: Not on file   Lifestyle    Physical activity:     Days per week: Not on file     Minutes per session: Not on file    Stress: Not on file   Relationships    Social connections:     Talks on phone: Not on file     Gets together: Not on file     Attends [employer] service: Not on file     Active member of club or organization: Not on  file     Attends meetings of clubs or organizations: Not on file     Relationship status: Not on file   Other Topics Concern    Not on file   Social History Narrative    Not on file         Current Outpatient Medications:     [START ON 10/10/2019] cabergoline (DOSTINEX) 0.5 mg tablet, Take 0.5 tablets (0.25 mg total) by mouth twice a week. Taking 1/2 tablet by mouth twice a week, Disp: 12 tablet, Rfl: 1    diclofenac (VOLTAREN) 75 MG EC tablet, , Disp: , Rfl:     levothyroxine (SYNTHROID) 75 MCG tablet, Take 1 tablet (75 mcg total) by mouth before breakfast., Disp: 30 tablet, Rfl: 11    medroxyPROGESTERone (DEPO-PROVERA) 400 mg/mL Susp, Inject 1,000 mg into the muscle every 3 (three) months., Disp: , Rfl:     olmesartan (BENICAR) 5 MG Tab, Take 5 mg by mouth once daily. , Disp: , Rfl:     oxyCODONE-acetaminophen (PERCOCET) 7.5-325 mg per tablet, Take 1 tablet by mouth every 6 (six) hours as needed., Disp: 28 tablet, Rfl: 0    polyethylene glycol (GLYCOLAX) 17 gram PwPk, Take 17 g by mouth once daily., Disp: , Rfl: 0    Review of Systems   Constitutional: Positive for unexpected weight change. Negative for fatigue.   HENT: Negative for dental problem.         Denies change in spacing of teeth.  Denies significant change to facial appearance.   Eyes: Negative for visual disturbance.        Denies double vision, and problems with peripheral vision.   Respiratory: Negative for shortness of breath and wheezing.    Cardiovascular: Negative for chest pain, palpitations and leg swelling.   Gastrointestinal: Negative for abdominal pain, constipation, diarrhea, nausea and vomiting.   Endocrine: Positive for cold intolerance. Negative for heat intolerance, polydipsia, polyphagia and polyuria.        Chronic unchanged cold intolerance   Musculoskeletal: Negative for arthralgias and myalgias.   Skin: Negative for color change and rash.   Neurological: Negative for light-headedness and headaches.   Hematological:  Negative for adenopathy. Does not bruise/bleed easily.   Psychiatric/Behavioral: Negative for agitation, dysphoric mood and sleep disturbance.       Objective:     Vitals:    10/08/19 1130   BP: (!) 152/94   Pulse: 88   Temp: 98 °F (36.7 °C)     Wt Readings from Last 3 Encounters:   10/08/19 79.2 kg (174 lb 9.7 oz)   10/08/19 79.2 kg (174 lb 9.7 oz)   06/05/19 78.3 kg (172 lb 9.9 oz)     Physical Exam   Constitutional: She is oriented to person, place, and time. She appears well-developed and well-nourished.   HENT:   Head: Normocephalic and atraumatic.   Eyes: EOM are normal.   Visual fields intact to confrontation   Neck: No thyromegaly present.   Cardiovascular: Normal rate and regular rhythm.   Pulmonary/Chest: Effort normal and breath sounds normal.   Abdominal: Soft.   Musculoskeletal: She exhibits no edema or deformity.   Lymphadenopathy:     She has no cervical adenopathy.   Neurological: She is alert and oriented to person, place, and time.   Skin: Skin is warm and dry.   Psychiatric: She has a normal mood and affect. Her behavior is normal.       Labs    Chemistry        Component Value Date/Time     09/24/2019 0810    K 3.6 09/24/2019 0810     09/24/2019 0810    CO2 23 09/24/2019 0810    BUN 9 09/24/2019 0810    CREATININE 0.7 09/24/2019 0810    GLU 90 09/24/2019 0810        Component Value Date/Time    CALCIUM 10.0 09/24/2019 0810    ALKPHOS 144 (H) 09/24/2019 0810    AST 29 09/24/2019 0810    ALT 27 09/24/2019 0810    BILITOT 1.0 09/24/2019 0810    ESTGFRAFRICA >60.0 09/24/2019 0810    EGFRNONAA >60.0 09/24/2019 0810        Component      Latest Ref Rng & Units 9/24/2019 4/11/2019 4/10/2019 4/9/2019   Insulin-Like GF-1      59 - 238 ng/mL  88 82 69   Z Score      -2.0 - 2.0 SD -1.65 -1.0 -1.2 -1.7   Somatomedin (IGF-I)      44 - 227 ng/mL 58      ACTH      0 - 46 pg/mL 19   23   FSH      See Text mIU/mL 4.70   2.00   LH      See Text mIU/mL 0.3   0.1   Prolactin      5.2 - 26.5 ng/mL 61.6  (H)   1021.3 (H)   Free T4      0.71 - 1.51 ng/dL 1.00   0.47 (L)   TSH      0.400 - 4.000 uIU/mL    0.476   Cortisol      ug/dL   12.00    Cortisol -8 AM      4.30 - 22.40 ug/dL 10.40            Assessment and Plan     Acquired hypothyroidism  Clinically euthyroid on 75 mcg levothyroxine daily with normal fT4 last month.  Will continue this dose and repeat fT4 in 6 months.      Hyperprolactinemia  Had excellent response to low dose cabergoline (postop PRL ~1000 --> 66) thus will continue current dose of 0.25 mg twice weekly.  Will need to follow closely given that she had an aggressive tumor.  Repeat PRL level with MRI in 6 months.    Pituitary tumor  Vision greatly improved and having excellent response to cabergoline thus no change to management now.  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  She has no symptoms of adrenal insufficiency.  We discussed symptoms and she will contact us if any develop.  Repeat MRI in 6 months given how aggressive tumor was.      RTC in 6 months    Abigail Curry MD

## 2019-12-31 ENCOUNTER — TELEPHONE (OUTPATIENT)
Dept: NEUROSURGERY | Facility: CLINIC | Age: 49
End: 2019-12-31

## 2020-03-11 ENCOUNTER — TELEPHONE (OUTPATIENT)
Dept: NEUROSURGERY | Facility: CLINIC | Age: 50
End: 2020-03-11

## 2020-03-11 NOTE — TELEPHONE ENCOUNTER
Scheduled labs, MRI, and appts with Eldon Pino and Patricio on 4/7. Left voicemail requesting call back to either verify appt times or reschedule to a more suitable time/day. Will send appts slips in mail.

## 2020-03-12 ENCOUNTER — TELEPHONE (OUTPATIENT)
Dept: NEUROSURGERY | Facility: CLINIC | Age: 50
End: 2020-03-12

## 2020-03-30 ENCOUNTER — TELEPHONE (OUTPATIENT)
Dept: NEUROSURGERY | Facility: CLINIC | Age: 50
End: 2020-03-30

## 2020-03-30 DIAGNOSIS — D49.7 PITUITARY TUMOR: ICD-10-CM

## 2020-03-30 DIAGNOSIS — D44.3 PROLACTINOMA OF UNCERTAIN BEHAVIOR: Primary | ICD-10-CM

## 2020-03-31 ENCOUNTER — TELEPHONE (OUTPATIENT)
Dept: NEUROSURGERY | Facility: CLINIC | Age: 50
End: 2020-03-31

## 2020-06-23 ENCOUNTER — TELEPHONE (OUTPATIENT)
Dept: NEUROSURGERY | Facility: CLINIC | Age: 50
End: 2020-06-23

## 2020-06-23 NOTE — TELEPHONE ENCOUNTER
----- Message from Jodishaggy Sweeney sent at 6/23/2020  2:48 PM CDT -----  Regarding: speak with nurse  Contact: patient  [phone]-please call above patient need to speak with the nurse concerning her Mri appointment said she can not come down her 2 days in a row waiting on a call back thanks.

## 2020-06-25 ENCOUNTER — TELEPHONE (OUTPATIENT)
Dept: NEUROSURGERY | Facility: CLINIC | Age: 50
End: 2020-06-25

## 2020-06-25 NOTE — TELEPHONE ENCOUNTER
Called pt with updated appt. Will mail.    ----- Message from Benson Boateng sent at 6/25/2020 10:36 AM CDT -----  Contact: pt @ [phone]  Pt asking to speak w/ nurse, pt states she cannot go to [facility] for MRI on 07/06/20. Pt asking to switch MRI appt on 07/06/20 to 07/07/20 at [facility].

## 2020-07-07 ENCOUNTER — OFFICE VISIT (OUTPATIENT)
Dept: NEUROSURGERY | Facility: CLINIC | Age: 50
End: 2020-07-07
Payer: COMMERCIAL

## 2020-07-07 ENCOUNTER — OFFICE VISIT (OUTPATIENT)
Dept: ENDOCRINOLOGY | Facility: CLINIC | Age: 50
End: 2020-07-07
Payer: COMMERCIAL

## 2020-07-07 ENCOUNTER — HOSPITAL ENCOUNTER (OUTPATIENT)
Dept: RADIOLOGY | Facility: HOSPITAL | Age: 50
Discharge: HOME | End: 2020-07-07
Attending: NEUROLOGICAL SURGERY
Payer: COMMERCIAL

## 2020-07-07 VITALS — DIASTOLIC BLOOD PRESSURE: 71 MMHG | SYSTOLIC BLOOD PRESSURE: 111 MMHG | TEMPERATURE: 99 F | HEART RATE: 88 BPM

## 2020-07-07 DIAGNOSIS — E22.1 HYPERPROLACTINEMIA: Primary | ICD-10-CM

## 2020-07-07 DIAGNOSIS — D49.7 PITUITARY TUMOR: ICD-10-CM

## 2020-07-07 DIAGNOSIS — E03.9 ACQUIRED HYPOTHYROIDISM: ICD-10-CM

## 2020-07-07 DIAGNOSIS — D44.3 PROLACTINOMA OF UNCERTAIN BEHAVIOR: ICD-10-CM

## 2020-07-07 DIAGNOSIS — D44.3 PROLACTINOMA OF UNCERTAIN BEHAVIOR: Primary | ICD-10-CM

## 2020-07-07 PROCEDURE — 99999 PR PBB SHADOW E&M-EST. PATIENT-LVL II: CPT | Mod: PBBFAC,,, | Performed by: NEUROLOGICAL SURGERY

## 2020-07-07 PROCEDURE — A9585 GADOBUTROL INJECTION: HCPCS | Performed by: NEUROLOGICAL SURGERY

## 2020-07-07 PROCEDURE — 99213 OFFICE O/P EST LOW 20 MIN: CPT | Mod: S$GLB,,, | Performed by: NEUROLOGICAL SURGERY

## 2020-07-07 PROCEDURE — 70553 MRI BRAIN STEM W/O & W/DYE: CPT | Mod: TC

## 2020-07-07 PROCEDURE — 99999 PR PBB SHADOW E&M-EST. PATIENT-LVL II: ICD-10-PCS | Mod: PBBFAC,,, | Performed by: NEUROLOGICAL SURGERY

## 2020-07-07 PROCEDURE — 99999 PR PBB SHADOW E&M-EST. PATIENT-LVL III: CPT | Mod: PBBFAC,,, | Performed by: INTERNAL MEDICINE

## 2020-07-07 PROCEDURE — 99213 PR OFFICE/OUTPT VISIT, EST, LEVL III, 20-29 MIN: ICD-10-PCS | Mod: S$GLB,,, | Performed by: NEUROLOGICAL SURGERY

## 2020-07-07 PROCEDURE — 25500020 PHARM REV CODE 255: Performed by: NEUROLOGICAL SURGERY

## 2020-07-07 PROCEDURE — 99214 PR OFFICE/OUTPT VISIT, EST, LEVL IV, 30-39 MIN: ICD-10-PCS | Mod: S$GLB,,, | Performed by: INTERNAL MEDICINE

## 2020-07-07 PROCEDURE — 99999 PR PBB SHADOW E&M-EST. PATIENT-LVL III: ICD-10-PCS | Mod: PBBFAC,,, | Performed by: INTERNAL MEDICINE

## 2020-07-07 PROCEDURE — 99214 OFFICE O/P EST MOD 30 MIN: CPT | Mod: S$GLB,,, | Performed by: INTERNAL MEDICINE

## 2020-07-07 PROCEDURE — 70553 MRI BRAIN STEM W/O & W/DYE: CPT | Mod: 26,,, | Performed by: RADIOLOGY

## 2020-07-07 PROCEDURE — 70553 MRI PITUITARY W W/O CONTRAST: ICD-10-PCS | Mod: 26,,, | Performed by: RADIOLOGY

## 2020-07-07 RX ORDER — GADOBUTROL 604.72 MG/ML
4 INJECTION INTRAVENOUS
Status: COMPLETED | OUTPATIENT
Start: 2020-07-07 | End: 2020-07-07

## 2020-07-07 RX ADMIN — GADOBUTROL 4 ML: 604.72 INJECTION INTRAVENOUS at 09:07

## 2020-07-07 NOTE — ASSESSMENT & PLAN NOTE
Having some fatigue but no other symptoms of hypothyroidism.  Compliant levothyroxine 75 mcg daily.  Will check free T4 today and adjust dose if necessary to keep free T4 in the upper half of the normal range.

## 2020-07-07 NOTE — PROGRESS NOTES
"Pituitary Clinic follow-up  07/07/2020     Last seen by Dr. Rojas 10/08/2019.  First visit with me today.    Chief complaint:  Follow-up prolactinoma in central hypothyroidism following transsphenoidal resection    History of Present Illness    Hyperprolactinemia  She underwent TSR on 4/8/19 by Dr. 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   Prior to surgery she was having vision problems which initially prompted evaluation.   Patient reports that because adenoma was growing rapidly and causing vision loss she was taken to surgery and has never tried medical management.  She has had normal IGF-1 in the past    She started 0.25 mg of cabergoline twice weekly after surgery and has tolerated it well without side effects.  She reports compliance with medication.    Today she is feeling well and has no complaints aside from some headaches And fatigue.  Recent labs show normal IGF-1, am cortisol, and ACTH.      denies symptoms of adrenal insufficiency (no lightheadedness, N/V/abd pain, hypotension).   No symptoms of DI (denies polyuria/polydipsia).       Galactorrhea:  Denies    Headaches:  Has been having some mild headaches about twice a month which are new for her    Vision:  Vision has improved since surgery but she continues to have some blurred peripheral vision in her right eye only.  Needs to see Ophthalmology for repeat visual field exam  saw Dr. Chowdhury (neurophthalmology) 10/08/2019 and has "permanent nerve fiber loss in both optic nerves but has sufficient reserves that she should be able to function normally."    Imaging:  MRI 07/07/2020-official read pending  I have independently reviewed the images which show residual pituitary adenoma invading the right cavernous sinus.  Infundibulum is deviated to the left, there is no " evident mass effect on the optic apparatus.    Menses:  Prior to transsphenoidal resection was getting depot shots thus not getting.  She has been off of the injections since her surgery and still has not had a period.  She does not know family history of menopause timing.    Lab Results   Component Value Date    PROLACTIN 61.6 (H) 09/24/2019    PROLACTIN 1021.3 (H) 04/09/2019       Central Hypothyroidism  She has central hypothyroidism (unclear if present prior to surgery but per patient was not told of any thyroid dysfunction prior) and was started on levothyroxine 25 mcg daily after surgery and has been gradually titrated up.      She reports compliance with 75 mcg daily.      She has been recently having some fatigue . Patient denies diarrhea, heat / cold intolerance, nervousness, palpitations and weight changes.     Lab Results   Component Value Date    TSH 0.476 04/09/2019    FREET4 1.00 09/24/2019             Current Outpatient Medications:     cabergoline (DOSTINEX) 0.5 mg tablet, Take 0.5 tablets (0.25 mg total) by mouth twice a week., Disp: 12 tablet, Rfl: 3    diclofenac (VOLTAREN) 75 MG EC tablet, , Disp: , Rfl:     levothyroxine (SYNTHROID) 75 MCG tablet, Take 1 tablet (75 mcg total) by mouth before breakfast., Disp: 30 tablet, Rfl: 11    medroxyPROGESTERone (DEPO-PROVERA) 400 mg/mL Susp, Inject 1,000 mg into the muscle every 3 (three) months., Disp: , Rfl:     olmesartan (BENICAR) 5 MG Tab, Take 5 mg by mouth once daily. , Disp: , Rfl:     oxyCODONE-acetaminophen (PERCOCET) 7.5-325 mg per tablet, Take 1 tablet by mouth every 6 (six) hours as needed., Disp: 28 tablet, Rfl: 0    polyethylene glycol (GLYCOLAX) 17 gram PwPk, Take 17 g by mouth once daily., Disp: , Rfl: 0        Objective:     Vitals:    07/07/20 0945   BP: 111/71   Pulse: 88   Temp: 98.6 °F (37 °C)     Wt Readings from Last 3 Encounters:   10/08/19 79.2 kg (174 lb 9.7 oz)   10/08/19 79.2 kg (174 lb 9.7 oz)   06/05/19 78.3 kg (172 lb  9.9 oz)     Constitutional:  Pleasant,  in no acute distress.   HENT:   Eyes:     No scleral icterus.  EOMI.  Slightly decreased temporal vision in right eye  Respiratory:   Effort normal   Gastrointestinal: Soft, nontender  Neurological:  normal speech  Psych:   Normal mood and affect.     Labs    Chemistry        Component Value Date/Time     09/24/2019 0810    K 3.6 09/24/2019 0810     09/24/2019 0810    CO2 23 09/24/2019 0810    BUN 9 09/24/2019 0810    CREATININE 0.7 09/24/2019 0810    GLU 90 09/24/2019 0810        Component Value Date/Time    CALCIUM 10.0 09/24/2019 0810    ALKPHOS 144 (H) 09/24/2019 0810    AST 29 09/24/2019 0810    ALT 27 09/24/2019 0810    BILITOT 1.0 09/24/2019 0810    ESTGFRAFRICA >60.0 09/24/2019 0810    EGFRNONAA >60.0 09/24/2019 0810        Lab Results   Component Value Date    PROLACTIN 61.6 (H) 09/24/2019    PROLACTIN 1021.3 (H) 04/09/2019    TSH 0.476 04/09/2019    FREET4 1.00 09/24/2019    FREET4 0.47 (L) 04/09/2019    ACTH 19 09/24/2019    ACTH 23 04/09/2019    CORTISOL 12.00 04/10/2019    LABLH 0.3 09/24/2019    LABLH 0.1 04/09/2019    FSH 4.70 09/24/2019    FSH 2.00 04/09/2019    SOMATMDN 58 09/24/2019     09/24/2019     04/10/2019     04/09/2019     04/04/2019    K 3.6 09/24/2019    K 3.7 04/10/2019    K 3.8 04/09/2019    K 4.0 04/04/2019    CALCIUM 10.0 09/24/2019    CALCIUM 9.8 04/10/2019    CALCIUM 9.5 04/09/2019    CALCIUM 11.2 (H) 04/04/2019    ALBUMIN 4.1 09/24/2019    ALBUMIN 4.9 04/04/2019    ESTGFRAFRICA >60.0 09/24/2019    ESTGFRAFRICA >60 06/05/2019    ESTGFRAFRICA >60 04/10/2019    ESTGFRAFRICA >60 04/09/2019    EGFRNONAA >60.0 09/24/2019    EGFRNONAA >60 06/05/2019    EGFRNONAA >60 04/10/2019    EGFRNONAA >60 04/09/2019      MRI 07/07/2020:    Assessment and Plan     Problem List Items Addressed This Visit        1 - High    Hyperprolactinemia - Primary     Will get labs today to see if prolactin is normal.  Given that on  pathology she had an aggressive prolactinoma with high Ki 67 index and bone invasion, will repeat her MRI in 3 months with follow-up at that time.  I have personally reviewed her MRI from today showing cavernous invasion in to the left side.  There is no compression of the optic apparatus.  She is overdue for visual field examination so will have this set up.         Relevant Orders    Prolactin       2     Pituitary tumor     Reviewed imaging from earlier today with Dr. Pino, currently no indication for gamma knife radio surgery but will repeat imaging in 3 months.  May need to increase dose of cabergoline based on prolactin level today.            3     Acquired hypothyroidism     Having some fatigue but no other symptoms of hypothyroidism.  Compliant levothyroxine 75 mcg daily.  Will check free T4 today and adjust dose if necessary to keep free T4 in the upper half of the normal range.         Relevant Orders    T4, free        Labs today (prolactin and free T4), RTC in 3 months with labs prior, needs to see Dr. Trenton Curry MD

## 2020-07-07 NOTE — ASSESSMENT & PLAN NOTE
Will get labs today to see if prolactin is normal.  Given that on pathology she had an aggressive prolactinoma with high Ki 67 index and bone invasion, will repeat her MRI in 3 months with follow-up at that time.  I have personally reviewed her MRI from today showing cavernous invasion in to the left side.  There is no compression of the optic apparatus.  She is overdue for visual field examination so will have this set up.

## 2020-07-07 NOTE — PATIENT INSTRUCTIONS
I have personally reviewed the MRI pituitary with the pt which shows a slight decrease in the extent of enhancement when compared to prior imaging.    Will monitor closely.    I will schedule the patient for 3 month follow up with MRI brain.

## 2020-07-07 NOTE — PROGRESS NOTES
Subjective:   I, Jinny Ferris, attest that this documentation has been prepared under the direction and in the presence of Tan Pino MD.     Patient ID: Gianna Menendez is a 49 y.o. female     Chief Complaint: No chief complaint on file.      HPI  Ms. Gianna Menendez is a pleasant 49 y.o. woman who is s/p transphenoidal hypophysectomy of pituitary tumor on 04/08/2019 and GSRS on 08/22/2019 with Dr. Gar. Pt was found to have a large prolactinoma from which she had associated visual field loss in early 2019. She never tried medical management prior to the resection as the tumor was rapidly growing and threatening her vision. She was seen by me on 10/08/2019 to establish care and reported her Prolactin level prior to and after surgery was approximately 6000 and 1000, respectively. She started 0.25 mg of Cabergoline twice weekly after surgery and tolerated it well without side effects.     She presents today for 6 month f/u with labs and MRI.   Pt is being seen in our multidisciplinary clinic with Dr. Curry. She states she has been doing well in the interim and her vision has improved.         Review of Systems   Constitutional: Negative for activity change, fatigue, fever and unexpected weight change.   HENT: Negative for facial swelling.    Eyes: Negative.    Respiratory: Negative.    Cardiovascular: Negative.    Gastrointestinal: Negative for diarrhea, nausea and vomiting.   Genitourinary: Negative.    Musculoskeletal: Negative for back pain, joint swelling, myalgias and neck pain.   Neurological: Negative for dizziness, weakness, numbness and headaches.   Psychiatric/Behavioral: Negative.       Past Medical History:   Diagnosis Date    Hypertension     Prolactinoma     Sciatic nerve pain        Objective:    There were no vitals filed for this visit.   Physical Exam  Constitutional:       Appearance: She is well-developed.   HENT:      Head: Normocephalic and atraumatic.   Neck:      Musculoskeletal:  Neck supple.   Neurological:      Mental Status: She is alert and oriented to person, place, and time.      GCS: GCS eye subscore is 4. GCS verbal subscore is 5. GCS motor subscore is 6.      Cranial Nerves: No cranial nerve deficit.      She is moving all extremities well.  She has no apparent neurological deficits.       IMAGING:  MRI Pituitary W W/O Contrast ( (07/07/2020) shows a slight decrease in the extent of enhancement when compared to prior imaging.    I have personally reviewed the images with the pt.      I, Dr. Tan Pino, personally performed the services described in this documentation. All medical record entries made by the scribe, Jinny Ferris, were at my direction and in my presence.  I have reviewed the chart and agree that the record reflects my personal performance and is accurate and complete. Tan Pino MD. 07/07/2020    Assessment:       Prolactinoma.     Plan:   I have personally reviewed the MRI pituitary with the pt which shows a slight decrease in the extent of enhancement when compared to prior imaging.    Will monitor closely.    I will schedule the patient for 3 month follow up with MRI brain.

## 2020-07-07 NOTE — ASSESSMENT & PLAN NOTE
Reviewed imaging from earlier today with Dr. Pino, currently no indication for gamma knife radio surgery but will repeat imaging in 3 months.  May need to increase dose of cabergoline based on prolactin level today.

## 2020-07-08 ENCOUNTER — TELEPHONE (OUTPATIENT)
Dept: ENDOCRINOLOGY | Facility: CLINIC | Age: 50
End: 2020-07-08

## 2020-07-08 NOTE — TELEPHONE ENCOUNTER
PT phone call was successful.  PT stated she will continue to take medication.    ----- Message from Deborah Gibbs RN sent at 7/7/2020  5:29 PM CDT -----    ----- Message -----  From: Abigail Curry MD  Sent: 7/7/2020   5:28 PM CDT  To: Patricio Hayes Staff    Please let patient know that her prolactin level is now completely normal at 10.5.  She needs to continue her current dose of cabergoline because if she stops it then the prolactin will go up and the tumor will likely grow.  Her thyroid labs are normal so she can continue her current dose of levothyroxine.

## 2020-10-06 ENCOUNTER — OFFICE VISIT (OUTPATIENT)
Dept: OPHTHALMOLOGY | Facility: CLINIC | Age: 50
End: 2020-10-06
Payer: COMMERCIAL

## 2020-10-06 ENCOUNTER — OFFICE VISIT (OUTPATIENT)
Dept: ENDOCRINOLOGY | Facility: CLINIC | Age: 50
End: 2020-10-06
Payer: COMMERCIAL

## 2020-10-06 ENCOUNTER — HOSPITAL ENCOUNTER (OUTPATIENT)
Dept: RADIOLOGY | Facility: HOSPITAL | Age: 50
Discharge: HOME | End: 2020-10-06
Attending: NEUROLOGICAL SURGERY
Payer: COMMERCIAL

## 2020-10-06 ENCOUNTER — CLINICAL SUPPORT (OUTPATIENT)
Dept: OPHTHALMOLOGY | Facility: CLINIC | Age: 50
End: 2020-10-06
Payer: COMMERCIAL

## 2020-10-06 VITALS
WEIGHT: 160.94 LBS | TEMPERATURE: 98 F | HEART RATE: 89 BPM | SYSTOLIC BLOOD PRESSURE: 150 MMHG | BODY MASS INDEX: 27.62 KG/M2 | DIASTOLIC BLOOD PRESSURE: 101 MMHG

## 2020-10-06 DIAGNOSIS — E03.9 ACQUIRED HYPOTHYROIDISM: ICD-10-CM

## 2020-10-06 DIAGNOSIS — N30.00 ACUTE CYSTITIS WITHOUT HEMATURIA: ICD-10-CM

## 2020-10-06 DIAGNOSIS — D49.7 PITUITARY TUMOR: ICD-10-CM

## 2020-10-06 DIAGNOSIS — D44.3 PROLACTINOMA OF UNCERTAIN BEHAVIOR: ICD-10-CM

## 2020-10-06 DIAGNOSIS — D35.2 BENIGN PROLACTINOMA: Primary | ICD-10-CM

## 2020-10-06 DIAGNOSIS — E22.1 HYPERPROLACTINEMIA: Primary | ICD-10-CM

## 2020-10-06 DIAGNOSIS — N39.0 UTI (URINARY TRACT INFECTION), UNCOMPLICATED: ICD-10-CM

## 2020-10-06 DIAGNOSIS — E22.1 HYPERPROLACTINEMIA: ICD-10-CM

## 2020-10-06 PROCEDURE — 70553 MRI BRAIN STEM W/O & W/DYE: CPT | Mod: 26,,, | Performed by: RADIOLOGY

## 2020-10-06 PROCEDURE — 99999 PR PBB SHADOW E&M-EST. PATIENT-LVL III: ICD-10-PCS | Mod: PBBFAC,,, | Performed by: OPHTHALMOLOGY

## 2020-10-06 PROCEDURE — 92014 PR EYE EXAM, EST PATIENT,COMPREHESV: ICD-10-PCS | Mod: S$GLB,,, | Performed by: OPHTHALMOLOGY

## 2020-10-06 PROCEDURE — 3008F BODY MASS INDEX DOCD: CPT | Mod: CPTII,S$GLB,, | Performed by: INTERNAL MEDICINE

## 2020-10-06 PROCEDURE — 92083 HUMPHREY VISUAL FIELD - OU - BOTH EYES: ICD-10-PCS | Mod: S$GLB,,, | Performed by: OPHTHALMOLOGY

## 2020-10-06 PROCEDURE — 3008F PR BODY MASS INDEX (BMI) DOCUMENTED: ICD-10-PCS | Mod: CPTII,S$GLB,, | Performed by: INTERNAL MEDICINE

## 2020-10-06 PROCEDURE — 25500020 PHARM REV CODE 255: Performed by: NEUROLOGICAL SURGERY

## 2020-10-06 PROCEDURE — 92014 COMPRE OPH EXAM EST PT 1/>: CPT | Mod: S$GLB,,, | Performed by: OPHTHALMOLOGY

## 2020-10-06 PROCEDURE — 70553 MRI BRAIN W WO CONTRAST: ICD-10-PCS | Mod: 26,,, | Performed by: RADIOLOGY

## 2020-10-06 PROCEDURE — 70553 MRI BRAIN STEM W/O & W/DYE: CPT | Mod: TC

## 2020-10-06 PROCEDURE — 99999 PR PBB SHADOW E&M-EST. PATIENT-LVL III: CPT | Mod: PBBFAC,,, | Performed by: OPHTHALMOLOGY

## 2020-10-06 PROCEDURE — A9585 GADOBUTROL INJECTION: HCPCS | Performed by: NEUROLOGICAL SURGERY

## 2020-10-06 PROCEDURE — 92083 EXTENDED VISUAL FIELD XM: CPT | Mod: S$GLB,,, | Performed by: OPHTHALMOLOGY

## 2020-10-06 PROCEDURE — 99999 PR PBB SHADOW E&M-EST. PATIENT-LVL III: CPT | Mod: PBBFAC,,, | Performed by: INTERNAL MEDICINE

## 2020-10-06 PROCEDURE — 99214 OFFICE O/P EST MOD 30 MIN: CPT | Mod: S$GLB,,, | Performed by: INTERNAL MEDICINE

## 2020-10-06 PROCEDURE — 99214 PR OFFICE/OUTPT VISIT, EST, LEVL IV, 30-39 MIN: ICD-10-PCS | Mod: S$GLB,,, | Performed by: INTERNAL MEDICINE

## 2020-10-06 PROCEDURE — 99999 PR PBB SHADOW E&M-EST. PATIENT-LVL III: ICD-10-PCS | Mod: PBBFAC,,, | Performed by: INTERNAL MEDICINE

## 2020-10-06 RX ORDER — GADOBUTROL 604.72 MG/ML
4 INJECTION INTRAVENOUS
Status: COMPLETED | OUTPATIENT
Start: 2020-10-06 | End: 2020-10-06

## 2020-10-06 RX ORDER — SULFAMETHOXAZOLE AND TRIMETHOPRIM 800; 160 MG/1; MG/1
1 TABLET ORAL 2 TIMES DAILY
Qty: 6 TABLET | Refills: 0 | Status: SHIPPED | OUTPATIENT
Start: 2020-10-06 | End: 2020-10-09

## 2020-10-06 RX ADMIN — GADOBUTROL 4 ML: 604.72 INJECTION INTRAVENOUS at 12:10

## 2020-10-06 NOTE — PATIENT INSTRUCTIONS
Please take bactrim 1 tablet twice a day for 3 days.  If you develop a fever or kidney pain see your primary care doctor right away.  If you keep having urinary symptoms after you finish the antibiotics I recommend following up with your primary care doctor.      Please monitor your blood pressure and if high (more than 140 for top number and more than 90 for bottom number) please see your primary care doctor.      Continue you current doses of cabergoline and levothryoxine.      Will see you in 1 year with labs

## 2020-10-06 NOTE — PROGRESS NOTES
"Pituitary Clinic follow-up  10/06/2020     Last seen by Dr. Rojas 10/08/2019.  First visit with me today.    Chief complaint:  Follow-up prolactinoma in central hypothyroidism following transsphenoidal resection    History of Present Illness    Hyperprolactinemia  She underwent TSR on 4/8/19 by Dr. 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   Prior to surgery she was having vision problems which initially prompted evaluation.   Patient reports that because adenoma was growing rapidly and causing vision loss she was taken to surgery and has never tried medical management.  She has had normal IGF-1 in the past    She started 0.25 mg of cabergoline twice weekly after surgery and has tolerated it well without side effects.  She reports compliance with medication.     Today she is feeling well and has no complaints.      denies symptoms of adrenal insufficiency (no lightheadedness, N/V/abd pain, hypotension).   No symptoms of DI (denies polyuria/polydipsia).       Galactorrhea:  Denies    Headaches:  Resolved    Vision:  No changes - Vision has improved since surgery but she continues to have some blurred peripheral vision in her right eye only.  Needs to see Ophthalmology for repeat visual field exam  saw Dr. 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  There is continue though slightly less apparent hypoenhancing material along the infundibulum and suprasellar cistern.  No evidence for new signal abnormality or enhancement to suggest worsening residual recurrent sellar lesion..    MRI 07/07/2020-  I " have independently reviewed the images which show residual pituitary adenoma invading the right cavernous sinus.  Infundibulum is deviated to the left, there is no evident mass effect on the optic apparatus.    Menses:  Prior to transsphenoidal resection was getting depot shots thus not getting period.  Has been on depo injections for 4-5 years due to severe cramping and heavy periods.   Last depo injection 6 mo ago    Lab Results   Component Value Date    PROLACTIN 6.8 10/06/2020    PROLACTIN 10.5 07/07/2020    PROLACTIN 61.6 (H) 09/24/2019    PROLACTIN 1021.3 (H) 04/09/2019     Results for RAUL RAZO (MRN [id number]) as of 10/6/2020 13:05   Ref. Range 4/10/2019 07:06 4/11/2019 05:29 9/24/2019 08:10 7/7/2020 11:30 10/6/2020 09:06   Cortisol Latest Units: ug/dL 12.00       Cortisol -8 AM Latest Ref Range: 4.30 - 22.40 ug/dL   10.40     ACTH Latest Ref Range: 0 - 46 pg/mL   19     Insulin-Like GF-1 Latest Ref Range: 59 - 238 ng/mL 82 88      Somatomedin (IGF-I) Latest Ref Range: 44 - 227 ng/mL   58     Free T4 Latest Ref Range: 0.71 - 1.51 ng/dL   1.00 0.91 0.94   FSH Latest Ref Range: See Text mIU/mL   4.70     LH Latest Ref Range: See Text mIU/mL   0.3     Prolactin Latest Ref Range: 5.2 - 26.5 ng/mL   61.6 (H) 10.5      Central Hypothyroidism  She has central hypothyroidism (unclear if present prior to surgery but per patient was not told of any thyroid dysfunction prior) and was started on levothyroxine 25 mcg daily after surgery and has been gradually titrated up.      She reports compliance with 75 mcg daily.  Takes appropriately   Patient denies BM changes, temperature intolerance, tremor/palpitations.  Some fatigue.  Down 10 lbs unintentionsally    Lab Results   Component Value Date    TSH 0.476 04/09/2019    FREET4 0.94 10/06/2020               Current Outpatient Medications:     cabergoline (DOSTINEX) 0.5 mg tablet, Take 0.5 tablets (0.25 mg total) by mouth twice a week., Disp: 12 tablet, Rfl: 3     diclofenac (VOLTAREN) 75 MG EC tablet, , Disp: , Rfl:     levothyroxine (SYNTHROID) 75 MCG tablet, Take 1 tablet (75 mcg total) by mouth before breakfast., Disp: 30 tablet, Rfl: 11    olmesartan (BENICAR) 5 MG Tab, Take 5 mg by mouth once daily. , Disp: , Rfl:     medroxyPROGESTERone (DEPO-PROVERA) 400 mg/mL Susp, Inject 1,000 mg into the muscle every 3 (three) months., Disp: , Rfl:   No current facility-administered medications for this visit.       Objective:   Didn't take olmesartan this am b/c was in a hurry.  Gets BP checked at work and reports normal.      Vitals:    10/06/20 1257   BP: (!) 150/101   Pulse: 89   Temp: 98.1 °F (36.7 °C)     Wt Readings from Last 3 Encounters:   10/06/20 73 kg (160 lb 15 oz)   10/08/19 79.2 kg (174 lb 9.7 oz)   10/08/19 79.2 kg (174 lb 9.7 oz)     Constitutional:  Pleasant,  in no acute distress.   HENT:   Eyes:     No scleral icterus.  EOMI.  Slightly decreased temporal vision in right eye  Respiratory:   Effort normal   Gastrointestinal: Soft, nontender  Neurological:  normal speech  Psych:   Normal mood and affect.     Labs    Chemistry        Component Value Date/Time     09/24/2019 0810    K 3.6 09/24/2019 0810     09/24/2019 0810    CO2 23 09/24/2019 0810    BUN 9 09/24/2019 0810    CREATININE 0.7 09/24/2019 0810    GLU 90 09/24/2019 0810        Component Value Date/Time    CALCIUM 10.0 09/24/2019 0810    ALKPHOS 144 (H) 09/24/2019 0810    AST 29 09/24/2019 0810    ALT 27 09/24/2019 0810    BILITOT 1.0 09/24/2019 0810    ESTGFRAFRICA >60.0 09/24/2019 0810    EGFRNONAA >60.0 09/24/2019 0810        Lab Results   Component Value Date    PROLACTIN 10.5 07/07/2020    PROLACTIN 61.6 (H) 09/24/2019    PROLACTIN 1021.3 (H) 04/09/2019    TSH 0.476 04/09/2019    FREET4 0.94 10/06/2020    FREET4 0.91 07/07/2020    FREET4 1.00 09/24/2019    FREET4 0.47 (L) 04/09/2019    ACTH 19 09/24/2019    ACTH 23 04/09/2019    CORTISOL 12.00 04/10/2019    LABLH 0.3 09/24/2019     LABLH 0.1 04/09/2019    FSH 4.70 09/24/2019    FSH 2.00 04/09/2019    SOMATMDN 58 09/24/2019     09/24/2019     04/10/2019     04/09/2019     04/04/2019    K 3.6 09/24/2019    K 3.7 04/10/2019    K 3.8 04/09/2019    K 4.0 04/04/2019    CALCIUM 10.0 09/24/2019    CALCIUM 9.8 04/10/2019    CALCIUM 9.5 04/09/2019    CALCIUM 11.2 (H) 04/04/2019    ALBUMIN 4.1 09/24/2019    ALBUMIN 4.9 04/04/2019    ESTGFRAFRICA >60.0 09/24/2019    ESTGFRAFRICA >60 06/05/2019    ESTGFRAFRICA >60 04/10/2019    ESTGFRAFRICA >60 04/09/2019    EGFRNONAA >60.0 09/24/2019    EGFRNONAA >60 06/05/2019    EGFRNONAA >60 04/10/2019    EGFRNONAA >60 04/09/2019      MRI 07/07/2020:    Assessment and Plan     Problem List Items Addressed This Visit        1 - High    Hyperprolactinemia - Primary     Prolactin remains normal on cabergoline.  Will continue current doses but in the future consider decreasing if prolactin remains low.  She will likely need to be on cabergoline long-term due to residual tumor in the cavernous sinuses.         Relevant Orders    Prolactin    T4, Free       2     Pituitary tumor     I personally reviewed the MRI she had done earlier today which shows no change in residual tumor compared to 3 months ago.  We have been closely monitoring her imaging due to elevated Ki-67 index pathology which could indicate more aggressive tumor.            3     Acquired hypothyroidism     Free T4 at goal, continue levothyroxine 75 mcg daily            Unprioritized    Acute cystitis without hematuria     She is having dysuria, urgency, frequency with no flank pain or fevers/chills.  Symptoms are consistent with uncomplicated UTI.  Will prescribe 3 day course of Bactrim however if symptoms do not resolve or if she develops symptoms of pyelonephritis like flank pain or fever, she will need to follow-up with her primary care doctor or urgent care.           Other Visit Diagnoses     UTI (urinary tract infection),  uncomplicated            Labs in 1 year (prolactin and free T4), RTC in 1 year, same time as Dr. Chowdhury and Dr. Pino.      Abigail Curry MD

## 2020-10-06 NOTE — Clinical Note
Dr. Pino was not able to see this patient in pituitary Clinic last week.  Post wondering if he was able to look at her MRI and decide when he wanted a follow-up.  We had ordered close follow-up imaging for her because her pathology showed high Ki-67 but looks pretty stable with normal prolactin.  I was thinking an MRI in a year should be sufficient

## 2020-10-06 NOTE — PROGRESS NOTES
HPI     Concerns About Ocular Health      Additional comments: Benign prolactinoma              Comments     DLS:10/08/2019 [place]  Patient here for annual check Benign Prolactinoma and Review HVF.  Pt states OU vision seem stable.   No eye pain.    I have personally interviewed the patient, reviewed the history and   examined the patient and agree with the technician's exam.          Last edited by Anjel Chowdhury MD on 10/6/2020 10:36 AM. (History)            Assessment /Plan     For exam results, see Encounter Report.    Benign prolactinoma  -     [place] Visual Field - OU - Extended - Both Eyes      I found no evidence of optic chiasm or cranial nerve dysfunction related to her prolactinoma. I will repeat her exam and visual field testing in one year or sooner if requested.

## 2020-10-06 NOTE — LETTER
October 6, 2020      Abigail Curry MD  [address]  [facility] [address]           [facility] [address]  [address]  [facility] [address]  Phone: [phone]  Fax: [phone]          Patient: Gianna Menendez   MR Number: [id number]   YOB: 1970   Date of Visit: 10/6/2020       Dear Dr. Abigail Curry:    Thank you for referring Gianna Menendez to me for evaluation. Attached you will find relevant portions of my assessment and plan of care.    If you have questions, please do not hesitate to call me. I look forward to following Gianna Menendez along with you.    Sincerely,    Anjel Chowdhury MD    Enclosure  CC:  No Recipients    If you would like to receive this communication electronically, please contact [email or web address] or [phone] to request more information on [facility] access.    For providers and/or their staff who would like to refer a patient to [facility], please contact us through our one-stop-shop provider referral line, [facility], at [phone].    If you feel you have received this communication in error or would no longer like to receive these types of communications, please e-mail [email or web address]

## 2020-10-11 PROBLEM — N30.00 ACUTE CYSTITIS WITHOUT HEMATURIA: Status: ACTIVE | Noted: 2020-10-11

## 2020-10-12 NOTE — ASSESSMENT & PLAN NOTE
Prolactin remains normal on cabergoline.  Will continue current doses but in the future consider decreasing if prolactin remains low.  She will likely need to be on cabergoline long-term due to residual tumor in the cavernous sinuses.

## 2020-10-12 NOTE — ASSESSMENT & PLAN NOTE
She is having dysuria, urgency, frequency with no flank pain or fevers/chills.  Symptoms are consistent with uncomplicated UTI.  Will prescribe 3 day course of Bactrim however if symptoms do not resolve or if she develops symptoms of pyelonephritis like flank pain or fever, she will need to follow-up with her primary care doctor or urgent care.

## 2020-10-12 NOTE — ASSESSMENT & PLAN NOTE
I personally reviewed the MRI she had done earlier today which shows no change in residual tumor compared to 3 months ago.  We have been closely monitoring her imaging due to elevated Ki-67 index pathology which could indicate more aggressive tumor.

## 2021-03-11 ENCOUNTER — TELEPHONE (OUTPATIENT)
Dept: NEUROSURGERY | Facility: CLINIC | Age: 51
End: 2021-03-11

## 2021-10-21 DIAGNOSIS — E03.9 ACQUIRED HYPOTHYROIDISM: ICD-10-CM

## 2021-10-21 RX ORDER — LEVOTHYROXINE SODIUM 75 UG/1
TABLET ORAL
Qty: 30 TABLET | Refills: 2 | Status: SHIPPED | OUTPATIENT
Start: 2021-10-21 | End: 2022-04-22

## 2021-10-22 ENCOUNTER — TELEPHONE (OUTPATIENT)
Dept: NEUROSURGERY | Facility: CLINIC | Age: 51
End: 2021-10-22

## 2021-10-22 DIAGNOSIS — D49.7 PITUITARY TUMOR: Primary | ICD-10-CM

## 2021-10-22 DIAGNOSIS — E22.1 HYPERPROLACTINEMIA: ICD-10-CM

## 2021-11-11 ENCOUNTER — CLINICAL SUPPORT (OUTPATIENT)
Dept: OPHTHALMOLOGY | Facility: CLINIC | Age: 51
End: 2021-11-11
Payer: COMMERCIAL

## 2021-11-11 ENCOUNTER — HOSPITAL ENCOUNTER (OUTPATIENT)
Dept: RADIOLOGY | Facility: HOSPITAL | Age: 51
Discharge: HOME | End: 2021-11-11
Attending: NEUROLOGICAL SURGERY
Payer: COMMERCIAL

## 2021-11-11 ENCOUNTER — OFFICE VISIT (OUTPATIENT)
Dept: OPHTHALMOLOGY | Facility: CLINIC | Age: 51
End: 2021-11-11
Payer: COMMERCIAL

## 2021-11-11 DIAGNOSIS — D49.7 PITUITARY TUMOR: ICD-10-CM

## 2021-11-11 DIAGNOSIS — D35.2 BENIGN PROLACTINOMA: Primary | ICD-10-CM

## 2021-11-11 DIAGNOSIS — E22.1 HYPERPROLACTINEMIA: ICD-10-CM

## 2021-11-11 DIAGNOSIS — D35.2 BENIGN PROLACTINOMA: ICD-10-CM

## 2021-11-11 PROCEDURE — 1159F MED LIST DOCD IN RCRD: CPT | Mod: CPTII,S$GLB,, | Performed by: OPHTHALMOLOGY

## 2021-11-11 PROCEDURE — A9585 GADOBUTROL INJECTION: HCPCS | Performed by: NEUROLOGICAL SURGERY

## 2021-11-11 PROCEDURE — 70553 MRI BRAIN STEM W/O & W/DYE: CPT | Mod: 26,,, | Performed by: RADIOLOGY

## 2021-11-11 PROCEDURE — 99213 OFFICE O/P EST LOW 20 MIN: CPT | Mod: S$GLB,,, | Performed by: OPHTHALMOLOGY

## 2021-11-11 PROCEDURE — 1159F PR MEDICATION LIST DOCUMENTED IN MEDICAL RECORD: ICD-10-PCS | Mod: CPTII,S$GLB,, | Performed by: OPHTHALMOLOGY

## 2021-11-11 PROCEDURE — 1160F RVW MEDS BY RX/DR IN RCRD: CPT | Mod: CPTII,S$GLB,, | Performed by: OPHTHALMOLOGY

## 2021-11-11 PROCEDURE — 70553 MRI PITUITARY W W/O CONTRAST: ICD-10-PCS | Mod: 26,,, | Performed by: RADIOLOGY

## 2021-11-11 PROCEDURE — 1160F PR REVIEW ALL MEDS BY PRESCRIBER/CLIN PHARMACIST DOCUMENTED: ICD-10-PCS | Mod: CPTII,S$GLB,, | Performed by: OPHTHALMOLOGY

## 2021-11-11 PROCEDURE — 70553 MRI BRAIN STEM W/O & W/DYE: CPT | Mod: TC

## 2021-11-11 PROCEDURE — 99213 PR OFFICE/OUTPT VISIT, EST, LEVL III, 20-29 MIN: ICD-10-PCS | Mod: S$GLB,,, | Performed by: OPHTHALMOLOGY

## 2021-11-11 PROCEDURE — 99999 PR PBB SHADOW E&M-EST. PATIENT-LVL III: CPT | Mod: PBBFAC,,, | Performed by: OPHTHALMOLOGY

## 2021-11-11 PROCEDURE — 25500020 PHARM REV CODE 255: Performed by: NEUROLOGICAL SURGERY

## 2021-11-11 PROCEDURE — 99999 PR PBB SHADOW E&M-EST. PATIENT-LVL III: ICD-10-PCS | Mod: PBBFAC,,, | Performed by: OPHTHALMOLOGY

## 2021-11-11 RX ORDER — GADOBUTROL 604.72 MG/ML
3.5 INJECTION INTRAVENOUS
Status: COMPLETED | OUTPATIENT
Start: 2021-11-11 | End: 2021-11-11

## 2021-11-11 RX ORDER — SODIUM PICOSULFATE, MAGNESIUM OXIDE, AND ANHYDROUS CITRIC ACID 10; 3.5; 12 MG/160ML; G/160ML; G/160ML
LIQUID ORAL
COMMUNITY
Start: 2021-10-23

## 2021-11-11 RX ADMIN — GADOBUTROL 3.5 ML: 604.72 INJECTION INTRAVENOUS at 02:11

## 2021-11-15 ENCOUNTER — OFFICE VISIT (OUTPATIENT)
Dept: NEUROSURGERY | Facility: CLINIC | Age: 51
End: 2021-11-15
Payer: COMMERCIAL

## 2021-11-15 DIAGNOSIS — D49.7 PITUITARY TUMOR: Primary | ICD-10-CM

## 2021-11-15 PROCEDURE — 1159F PR MEDICATION LIST DOCUMENTED IN MEDICAL RECORD: ICD-10-PCS | Mod: CPTII,95,, | Performed by: NEUROLOGICAL SURGERY

## 2021-11-15 PROCEDURE — 1160F PR REVIEW ALL MEDS BY PRESCRIBER/CLIN PHARMACIST DOCUMENTED: ICD-10-PCS | Mod: CPTII,95,, | Performed by: NEUROLOGICAL SURGERY

## 2021-11-15 PROCEDURE — 1159F MED LIST DOCD IN RCRD: CPT | Mod: CPTII,95,, | Performed by: NEUROLOGICAL SURGERY

## 2021-11-15 PROCEDURE — 99214 PR OFFICE/OUTPT VISIT, EST, LEVL IV, 30-39 MIN: ICD-10-PCS | Mod: 95,,, | Performed by: NEUROLOGICAL SURGERY

## 2021-11-15 PROCEDURE — 99214 OFFICE O/P EST MOD 30 MIN: CPT | Mod: 95,,, | Performed by: NEUROLOGICAL SURGERY

## 2021-11-15 PROCEDURE — 1160F RVW MEDS BY RX/DR IN RCRD: CPT | Mod: CPTII,95,, | Performed by: NEUROLOGICAL SURGERY

## 2022-12-01 ENCOUNTER — NURSE TRIAGE (OUTPATIENT)
Dept: ADMINISTRATIVE | Facility: CLINIC | Age: 52
End: 2022-12-01
Payer: COMMERCIAL

## 2022-12-02 ENCOUNTER — TELEPHONE (OUTPATIENT)
Dept: NEUROSURGERY | Facility: CLINIC | Age: 52
End: 2022-12-02
Payer: COMMERCIAL

## 2022-12-02 NOTE — TELEPHONE ENCOUNTER
"Patient states she sees Dr. Pino for a brain tumor. She had surgery about 3-4 years ago, but she has recently started having an increase in symptoms. She is having sharp pains in the back of her neck which radiate to her head and causes headaches. She states she is also experiencing a popping sensation in her neck. Currently has neck pain which is rated at a 6/10 on numerical pain scale. She states these incidents are increasing in frequency and affecting her work life. She is unable to complete her full shfits at work due to symptoms. She also has intermittent weakness, blurry vision, and issues with bowel control. Care advice is to go to ED. She refuses the disposition, stating she was just hoping to make an appt. Reiterated care advice and informed I will route message to the provider's office.     Reason for Disposition   Problems with bowel or bladder control    Additional Information   Negative: Shock suspected (e.g., cold/pale/clammy skin, too weak to stand, low BP, rapid pulse)   Negative: Difficult to awaken or acting confused (e.g., disoriented, slurred speech)   Negative: [1] Similar pain previously AND [2] it was from "heart attack"   Negative: [1] Similar pain previously AND [2] it was from "angina" AND [3] not relieved by nitroglycerin   Negative: Sounds like a life-threatening emergency to the triager   Negative: Difficulty breathing or unusual sweating (e.g., sweating without exertion)   Negative: [1] Stiff neck (can't put chin to chest) AND [2] headache   Negative: [1] Stiff neck (can't put chin to chest) AND [2] fever   Negative: Weakness of an arm or hand    Protocols used: Neck Pain or [id number]    "

## 2024-06-06 ENCOUNTER — TELEPHONE (OUTPATIENT)
Dept: NEUROSURGERY | Facility: CLINIC | Age: 54
End: 2024-06-06
Payer: COMMERCIAL

## 2024-06-06 NOTE — TELEPHONE ENCOUNTER
----- Message from Debbie Reynoso sent at 6/5/2024  4:20 PM CDT -----  Regarding: PT  Contact: PT  Pt called requesting a call back to get some information for disability.    Please advise. Pt can be reached at  [phone]

## 2025-01-27 NOTE — TELEPHONE ENCOUNTER
Appointment scheduled appropriately.    Encounter Closed.      Returned pt's call. Pt will proceed with surgery on Monday.

## 2025-02-19 NOTE — TELEPHONE ENCOUNTER
Returned pt's call  She needs a work excuse for today. Letter done and faxed to Ellyn at [phone].    Asked pt how she was doing. States she feels fine. Confirmed I would contact her in 2 wks for check on her.   [Negative] : Heme/Lymph